# Patient Record
Sex: FEMALE | Race: WHITE
[De-identification: names, ages, dates, MRNs, and addresses within clinical notes are randomized per-mention and may not be internally consistent; named-entity substitution may affect disease eponyms.]

---

## 2019-07-14 ENCOUNTER — HOSPITAL ENCOUNTER (INPATIENT)
Dept: HOSPITAL 10 - OBT | Age: 24
LOS: 3 days | Discharge: HOME | End: 2019-07-17
Attending: OBSTETRICS & GYNECOLOGY | Admitting: OBSTETRICS & GYNECOLOGY
Payer: MEDICAID

## 2019-07-14 ENCOUNTER — HOSPITAL ENCOUNTER (INPATIENT)
Dept: HOSPITAL 91 - OBT | Age: 24
LOS: 3 days | Discharge: HOME | End: 2019-07-17
Payer: MEDICAID

## 2019-07-14 VITALS
WEIGHT: 204.37 LBS | HEIGHT: 61 IN | BODY MASS INDEX: 38.58 KG/M2 | BODY MASS INDEX: 38.58 KG/M2 | HEIGHT: 61 IN | WEIGHT: 204.37 LBS

## 2019-07-14 VITALS — DIASTOLIC BLOOD PRESSURE: 60 MMHG | SYSTOLIC BLOOD PRESSURE: 119 MMHG | RESPIRATION RATE: 20 BRPM | HEART RATE: 75 BPM

## 2019-07-14 DIAGNOSIS — Z3A.38: ICD-10-CM

## 2019-07-14 LAB
ABNORMAL IP MESSAGE: 1
ADD MAN DIFF?: NO
BASOPHIL #: 0 10^3/UL (ref 0–0.1)
BASOPHILS %: 0.3 % (ref 0–2)
EOSINOPHILS #: 0.4 10^3/UL (ref 0–0.5)
EOSINOPHILS %: 4.2 % (ref 0–7)
HEMATOCRIT: 37 % (ref 37–47)
HEMOGLOBIN: 12.2 G/DL (ref 12–16)
HEPATITIS B SURFACE ANTIGEN: NEGATIVE
IMMATURE GRANS #M: 0.04 10^3/UL (ref 0–0.03)
IMMATURE GRANS % (M): 0.5 % (ref 0–0.43)
INR: 0.84
LYMPHOCYTES #: 2 10^3/UL (ref 0.8–2.9)
LYMPHOCYTES %: 22.5 % (ref 15–51)
MEAN CORPUSCULAR HEMOGLOBIN: 30.5 PG (ref 29–33)
MEAN CORPUSCULAR HGB CONC: 33 G/DL (ref 32–37)
MEAN CORPUSCULAR VOLUME: 92.5 FL (ref 82–101)
MEAN PLATELET VOLUME: 13.4 FL (ref 7.4–10.4)
MONOCYTE #: 0.9 10^3/UL (ref 0.3–0.9)
MONOCYTES %: 9.9 % (ref 0–11)
NEUTROPHIL #: 5.5 10^3/UL (ref 1.6–7.5)
NEUTROPHILS %: 62.6 % (ref 39–77)
NUCLEATED RED BLOOD CELLS #: 0 10^3/UL (ref 0–0)
NUCLEATED RED BLOOD CELLS%: 0 /100WBC (ref 0–0)
PARTIAL THROMBOPLASTIN TIME: 27.3 SEC (ref 23–35)
PLATELET COUNT: 183 10^3/UL (ref 140–415)
POSITIVE DIFF: (no result)
PROTIME: 11.6 SEC (ref 11.9–14.9)
PT RATIO: 0.9
RED BLOOD COUNT: 4 10^6/UL (ref 4.2–5.4)
RED CELL DISTRIBUTION WIDTH: 14.4 % (ref 11.5–14.5)
WHITE BLOOD COUNT: 8.7 10^3/UL (ref 4.8–10.8)

## 2019-07-14 PROCEDURE — 86592 SYPHILIS TEST NON-TREP QUAL: CPT

## 2019-07-14 PROCEDURE — 86762 RUBELLA ANTIBODY: CPT

## 2019-07-14 PROCEDURE — 85025 COMPLETE CBC W/AUTO DIFF WBC: CPT

## 2019-07-14 PROCEDURE — 86850 RBC ANTIBODY SCREEN: CPT

## 2019-07-14 PROCEDURE — G0463 HOSPITAL OUTPT CLINIC VISIT: HCPCS

## 2019-07-14 PROCEDURE — 85730 THROMBOPLASTIN TIME PARTIAL: CPT

## 2019-07-14 PROCEDURE — 76815 OB US LIMITED FETUS(S): CPT

## 2019-07-14 PROCEDURE — 88307 TISSUE EXAM BY PATHOLOGIST: CPT

## 2019-07-14 PROCEDURE — 86900 BLOOD TYPING SEROLOGIC ABO: CPT

## 2019-07-14 PROCEDURE — 62322 NJX INTERLAMINAR LMBR/SAC: CPT

## 2019-07-14 PROCEDURE — 90716 VAR VACCINE LIVE SUBQ: CPT

## 2019-07-14 PROCEDURE — 87340 HEPATITIS B SURFACE AG IA: CPT

## 2019-07-14 PROCEDURE — 85610 PROTHROMBIN TIME: CPT

## 2019-07-14 PROCEDURE — 86901 BLOOD TYPING SEROLOGIC RH(D): CPT

## 2019-07-14 RX ADMIN — PYRIDOXINE HYDROCHLORIDE SCH MLS/HR: 100 INJECTION, SOLUTION INTRAMUSCULAR; INTRAVENOUS at 20:45

## 2019-07-14 RX ADMIN — PYRIDOXINE HYDROCHLORIDE 1 MLS/HR: 100 INJECTION, SOLUTION INTRAMUSCULAR; INTRAVENOUS at 20:45

## 2019-07-14 RX ADMIN — PYRIDOXINE HYDROCHLORIDE 1 MLS/HR: 100 INJECTION, SOLUTION INTRAMUSCULAR; INTRAVENOUS at 22:48

## 2019-07-14 NOTE — TRIAGE
===================================

OB Triage

===================================

Datetime Report Generated by CPN: 07/14/2019 20:28

   

   

===========================

Datetime: 07/14/2019 20:15

===========================

   

   

===================================

Vaginal Exam

===================================

   

 Dilatation (cms):  4.5

 Membrane Status:  Intact

   

===========================

Datetime: 07/14/2019 20:06

===========================

   

   

===================================

Pain Assessment

===================================

   

 Pain Scale:  7

 Pain Presence:  Intermittent

 Pain Type:  Contraction

 Pain Location:  Abdomen; Back

 Pain Goal:  7

 Pain Relief Measures:  Comfort Measures

 Pain Assessment Comments:  states pain is tolerable

   

===========================

Datetime: 07/14/2019 20:00

===========================

   

 Assessment Type:  Triage

   

===================================

Maternal Assessment

===================================

   

 Level of Consciousness:  Keenly Alert, Responsive

 DTR's/Clonus:  DTRs 2+; No Clonus

 Headache:  Denies

 Blurred Vision:  No

 Respiratory Effort:  Unlabored; Regular Rhythm; Equal Expansion

 Breath Sounds, Left:  Clear and Equal

 Breath Sounds, Right:  Clear and Equal

 Nausea/Vomiting:  Denies

 RUQ Epigastric Pain:  Denies

 Lower Extremities Edema:  Bilateral Lower Extremities

     Degree:  1+

 Upper Extremities Edema:  None

     Degree:  None

 Facial Edema:  None

   

===================================

Fall Risk Assessment

===================================

   

 History of Falling:  (0) No

 Secondary Diagnosis:  (0) No

 Ambulatory Aid:  (0) Bedrest/Nurse Assist

 IV Therapy:  (0) No

 Gait:  (0) Normal/Bedrest/Immobile

 Mental Status:  (0) Oriented to Own Ability

 Fall Score:  0

 Fall Risk Score Definition:  No Risk: No action required

   

===========================

Datetime: 07/14/2019 19:57

===========================

   

 Time of Arrival:  07/14/2019 19:54

 EGA:  38.1

 Arrived By:  Ambulatory

 Arrived From:  Home

 Chief Complaint:  UCS, SPOTTING

 Fetal Movement:  Decreased

 Contractions:  Regular

 Contractions:  5-7 MIN

 Rupture of Membranes:  Denies

 Vaginal Bleeding:  None

 Vaginal Discharge:  Denies

 Recent Sexual Intercouse:  Denies

 Abdominal Trauma:  Not Applicable

 Patient Complaints:  Contractions

 Time Provider Notified:  07/14/2019 20:23

 Provider Notified:  VIJAYA

 Initial Plan:  ERIKA HUTCHINSON

## 2019-07-14 NOTE — TRIAGE
===================================

OB Triage

===================================

Datetime Report Generated by CPN: 07/14/2019 20:34

   

   

===========================

Datetime: 07/14/2019 20:30

===========================

   

 Frequency:  1.5-5

 Monitor Mode:  External

 Duration (sec)2399:  

 Quality:  Moderate

 Pattern:  Normal: <= 5 Contractions in 10 Minutes

 Resting Tone [place]:  Relaxed

 FHR Baseline Rate:  150

 Monitor Mode:  External US

 Variability:  Moderate 6-25 bpm

 Accelerations:  15X15

 Decelerations:  Late

 Category:  Category II

## 2019-07-15 VITALS — SYSTOLIC BLOOD PRESSURE: 94 MMHG | RESPIRATION RATE: 19 BRPM | DIASTOLIC BLOOD PRESSURE: 50 MMHG | HEART RATE: 61 BPM

## 2019-07-15 VITALS — SYSTOLIC BLOOD PRESSURE: 112 MMHG | RESPIRATION RATE: 18 BRPM | HEART RATE: 82 BPM | DIASTOLIC BLOOD PRESSURE: 77 MMHG

## 2019-07-15 VITALS — RESPIRATION RATE: 18 BRPM | SYSTOLIC BLOOD PRESSURE: 114 MMHG | HEART RATE: 80 BPM | DIASTOLIC BLOOD PRESSURE: 68 MMHG

## 2019-07-15 VITALS — HEART RATE: 78 BPM | SYSTOLIC BLOOD PRESSURE: 110 MMHG | DIASTOLIC BLOOD PRESSURE: 76 MMHG | RESPIRATION RATE: 20 BRPM

## 2019-07-15 LAB — RAPID PLASMA REAGIN: NONREACTIVE

## 2019-07-15 PROCEDURE — 0HQ9XZZ REPAIR PERINEUM SKIN, EXTERNAL APPROACH: ICD-10-PCS | Performed by: OBSTETRICS & GYNECOLOGY

## 2019-07-15 PROCEDURE — 0HQ9XZZ REPAIR PERINEUM SKIN, EXTERNAL APPROACH: ICD-10-PCS

## 2019-07-15 RX ADMIN — Medication 1 MLS/HR: at 11:10

## 2019-07-15 RX ADMIN — IBUPROFEN 1 MG: 800 TABLET, FILM COATED ORAL at 11:25

## 2019-07-15 RX ADMIN — FENTANYL CIT 0.2 MG/100ML-ROPIV 0.2%-NACL 0.9% EPIDURAL INJ 1 ML: 2/0.2 SOLUTION at 02:43

## 2019-07-15 RX ADMIN — PYRIDOXINE HYDROCHLORIDE SCH MLS/HR: 100 INJECTION, SOLUTION INTRAMUSCULAR; INTRAVENOUS at 11:10

## 2019-07-15 RX ADMIN — PYRIDOXINE HYDROCHLORIDE 1 MLS/HR: 100 INJECTION, SOLUTION INTRAMUSCULAR; INTRAVENOUS at 21:09

## 2019-07-15 RX ADMIN — Medication 1 APPLIC: at 11:27

## 2019-07-15 RX ADMIN — PYRIDOXINE HYDROCHLORIDE SCH MLS/HR: 100 INJECTION, SOLUTION INTRAMUSCULAR; INTRAVENOUS at 01:47

## 2019-07-15 RX ADMIN — Medication 56 SPRAY: at 11:26

## 2019-07-15 RX ADMIN — PYRIDOXINE HYDROCHLORIDE 1 MLS/HR: 100 INJECTION, SOLUTION INTRAMUSCULAR; INTRAVENOUS at 08:03

## 2019-07-15 RX ADMIN — IBUPROFEN SCH MG: 800 TABLET ORAL at 23:40

## 2019-07-15 RX ADMIN — IBUPROFEN SCH MG: 800 TABLET ORAL at 11:25

## 2019-07-15 RX ADMIN — PYRIDOXINE HYDROCHLORIDE 1 MLS/HR: 100 INJECTION, SOLUTION INTRAMUSCULAR; INTRAVENOUS at 11:10

## 2019-07-15 RX ADMIN — IBUPROFEN SCH MG: 800 TABLET ORAL at 17:53

## 2019-07-15 RX ADMIN — WITCH HAZEL 40 PAD: 500 SOLUTION RECTAL; TOPICAL at 11:27

## 2019-07-15 RX ADMIN — PYRIDOXINE HYDROCHLORIDE 1 MLS/HR: 100 INJECTION, SOLUTION INTRAMUSCULAR; INTRAVENOUS at 01:47

## 2019-07-15 RX ADMIN — Medication 1 MLS/HR: at 09:31

## 2019-07-15 RX ADMIN — Medication PRN APPLIC: at 11:27

## 2019-07-15 RX ADMIN — IBUPROFEN 1 MG: 800 TABLET, FILM COATED ORAL at 17:53

## 2019-07-15 RX ADMIN — PYRIDOXINE HYDROCHLORIDE SCH MLS/HR: 100 INJECTION, SOLUTION INTRAMUSCULAR; INTRAVENOUS at 21:09

## 2019-07-15 RX ADMIN — IBUPROFEN 1 MG: 800 TABLET, FILM COATED ORAL at 23:40

## 2019-07-15 RX ADMIN — Medication 1 MLS/HR: at 10:27

## 2019-07-15 RX ADMIN — PYRIDOXINE HYDROCHLORIDE SCH MLS/HR: 100 INJECTION, SOLUTION INTRAMUSCULAR; INTRAVENOUS at 08:03

## 2019-07-15 NOTE — HP
Date/Time of Note


Date/Time of Note


DATE: 7/15/19 


TIME: 08:54





OB - History


Hx of Present Pregnancy


Free Text/Dictation


term pregnancy iin activve labor


:  1


Para:  0


Ultrasounds:  Normal mid trimester US


Obstetrical Complications:  None


Medical Complications:  None





Past Family/Social History


*


Past Medical, Surgical, Family and Obstetric Histories reviewed from prenatal 


chart.





OB  Admission Exam


Vital Signs


Vital Signs





Vital Signs


  Date      Temp  Pulse  Resp  B/P (MAP)   Pulse Ox  O2          O2 Flow    FiO2


Time                                                 Delivery    Rate


   19  98.0     75    20      119/60            Room Air


     20:17                           (79)








Physical Exam


HEENT:  WNL


Heart:  Rhythm Normal


Lungs:  Clear, Equal


Abdomen:  WNL


Extremities:  Normal


Reflexes:  Normal


Cervical Dilatation:  10cm


Effacement:  100%


Station:  +3


Membranes:  Ruptured


Amniotic Fluid:  Thick Meconium


Fetal Heart Rate:  130's


Accelerations:  Accelerations Present


Decelerations:  No Decelerations


Varibility:  Moderate


Contractions on Admission:  6-10 Minutes Apart


Intensity:  Moderate


Last 72 hours Lab Results


                                    CBC & BMP


19 20:30











OB  Assessment/Plan


Reason for admission:  active labor


Plan:  Expectant Management











MORGAN LILLY MD             Jul 15, 2019 08:55

## 2019-07-15 NOTE — PREAC
Date/Time of Note


Date/Time of Note


DATE: 7/15/19 


TIME: 00:07





Anesthesia Eval and Record


Evaluation


Time Pre-Procedure Interview


DATE: 19 


TIME: 23:20


Age


24


Sex


female


NPO:  8 hrs


Preoperative diagnosis


iup @ 38 wks., , labor


Planned procedure


luis angel





Past Medical History


Past Medical History:  Includes


Pregnancy:  : (2), Para: (0), Gestational age: (38 wks.)





Surgery & Anesthesia Issues


No known issue





Meds


Anticoagulation:  No


Beta Blocker within 24 hr:  No


Reason Beta Blocker not given:  Pt. not on B-Blocker


Reported Medications


Prenatal Vit No.124/Iron/FA (Prenatal Vitamin Tablet) 1 Each Tablet, 1 EACH PO 


DAILY, TAB


   19





Current Medications


Lactated Ringer's 1,000 ml @  125 mls/hr Q8H IV  Last administered on 19at 


20:45; Admin Dose 125 MLS/HR;  Start 19 at 20:23


Butorphanol Tartrate (Stadol) 2 mg Q2H  PRN IV .PAIN SCALE 6-10;  Start 19 


at 20:30


Lidocaine (Xylocaine 1% (Mpf)) 30 ml ONCE PRN INJ .EPISIOTOMY;  Start 19 at


20:30


Oxytocin/Lactated Ringer's 500 ml @  500 mls/hr ONCE POST PARTUM IV ;  Start 


19 at 20:30


Oxytocin/Lactated Ringer's 500 ml @  125 mls/hr POST PARTUM IV ;  Start 19 


at 20:30


Ibuprofen (Motrin) 600 mg ONCE PRN PO .PAIN 1-5;  Start 19 at 20:30


Lactated Ringer's 1,000 ml @  2,000 mls/hr Q30M PRN IV .ANESTHESIA Last 


administered on 19at 22:48; Admin Dose 2,000 MLS/HR;  Start 19 at 


20:23


Oxytocin/Lactated Ringer's 500 ml @ 0 mls/hr ONCE PRN IV .VAGINAL BLEEDING;  


Start 19 at 20:30


Methylergonovine Maleate (Methergine) 0.2 mg ONCE PRN IM .VAGINAL BLEEDING;  


Start 19 at 20:30


Carboprost Tromethamine (Hemabate) 250 mcg ONCE PRN IM .VAGINAL BLEEDING;  Start


19 at 20:30


Misoprostol (Cytotec) 1,000 mcg ONCE PRN OK .VAGINAL BLEEDING;  Start 19 at


20:30


Meds reviewed:  Yes





Allergies


Coded Allergies:  


     No Known Allergy (Unverified , 19)


Allergies Reviewed:  Yes





Labs/Studies


Labs Reviewed:  Reviewed by anesthesiologist


Result Diagram:  


19 2030





Laboratory Tests


19 20:30











Blood Bank


                  Test
                         19
20:30


                  Antibody Screen               NEGATIVE


                  Blood Type                    O POSITIVE


                  Rh Immune Globulin Candidate  NO





Pregnancy test:  Positive


Studies:  ECG (n/a), CXR (n/a)





Pre-procedure Exam


Last vitals





Vital Signs


  Date      Temp  Pulse  Resp  B/P (MAP)   Pulse Ox  O2          O2 Flow    FiO2


Time                                                 Delivery    Rate


   19  98.0     75    20      119/60            Room Air


     20:17                           (79)





Airway:  Adequate mouth opening, Adequate thyromental dist


Mallampati:  Mallampati II


Teeth:  Normal


Lung:  Normal


Heart:  Normal





ASA Physical Status


ASA physical status:  2


Emergency:  E





Planned Anesthetic


Neuraxial:  Epidural





Planned Pain Management


Epidural, Parenteral pain med, Local by surgeon





Pre-operative Attestations


Prior to commencing anesthesia and surgery, the patient was re-evaluated, there 


was verification of:


*The patient's identity


*The results of appropriate recent lab work and preoperative vital signs


*The above evaluation not changing prior to induction


*Anesthetic plan, risk benefits, alternative and complications discussed with 


patient/family; questions answered; patient/family understands, accepts and 


wishes to proceed.


 used











BARRIE CISNEROS MD           Jul 15, 2019 00:08

## 2019-07-15 NOTE — LDN
Date/Time of Note


Date/Time of Note


DATE: 7/15/19 


TIME: 08:55





Delivery Summary


NSD W.O COMPLIACTIONS


Placenta Delivered:  Spontaneously


Meconium:  none, Thick


Episiotomy:  No


Perineal laceration:  1


Anesthesia type:  Epidural


Estimated blood loss:  300


Sponge & Needle done & correct:  Yes


All needle counts correct:  Yes


Any foreign bodies felt in the:  No











MORGAN LILLY MD             Jul 15, 2019 08:56

## 2019-07-16 VITALS — HEART RATE: 70 BPM | SYSTOLIC BLOOD PRESSURE: 90 MMHG | RESPIRATION RATE: 18 BRPM | DIASTOLIC BLOOD PRESSURE: 60 MMHG

## 2019-07-16 VITALS — HEART RATE: 70 BPM | RESPIRATION RATE: 20 BRPM | SYSTOLIC BLOOD PRESSURE: 86 MMHG | DIASTOLIC BLOOD PRESSURE: 55 MMHG

## 2019-07-16 VITALS — HEART RATE: 70 BPM | SYSTOLIC BLOOD PRESSURE: 97 MMHG | RESPIRATION RATE: 18 BRPM | DIASTOLIC BLOOD PRESSURE: 56 MMHG

## 2019-07-16 VITALS — RESPIRATION RATE: 18 BRPM | HEART RATE: 89 BPM | SYSTOLIC BLOOD PRESSURE: 116 MMHG | DIASTOLIC BLOOD PRESSURE: 66 MMHG

## 2019-07-16 LAB
ABNORMAL IP MESSAGE: 1
ADD MAN DIFF?: NO
BASOPHIL #: 0.1 10^3/UL (ref 0–0.1)
BASOPHILS %: 0.4 % (ref 0–2)
EOSINOPHILS #: 0.4 10^3/UL (ref 0–0.5)
EOSINOPHILS %: 2.3 % (ref 0–7)
HEMATOCRIT: 31.8 % (ref 37–47)
HEMOGLOBIN: 10.5 G/DL (ref 12–16)
IMMATURE GRANS #M: 0.08 10^3/UL (ref 0–0.03)
IMMATURE GRANS % (M): 0.5 % (ref 0–0.43)
LYMPHOCYTES #: 2.3 10^3/UL (ref 0.8–2.9)
LYMPHOCYTES %: 15.2 % (ref 15–51)
MEAN CORPUSCULAR HEMOGLOBIN: 30.5 PG (ref 29–33)
MEAN CORPUSCULAR HGB CONC: 33 G/DL (ref 32–37)
MEAN CORPUSCULAR VOLUME: 92.4 FL (ref 82–101)
MEAN PLATELET VOLUME: 13.3 FL (ref 7.4–10.4)
MONOCYTE #: 0.9 10^3/UL (ref 0.3–0.9)
MONOCYTES %: 5.9 % (ref 0–11)
NEUTROPHIL #: 11.5 10^3/UL (ref 1.6–7.5)
NEUTROPHILS %: 75.7 % (ref 39–77)
NUCLEATED RED BLOOD CELLS #: 0 10^3/UL (ref 0–0)
NUCLEATED RED BLOOD CELLS%: 0 /100WBC (ref 0–0)
PLATELET COUNT: 152 10^3/UL (ref 140–415)
POSITIVE DIFF: (no result)
RED BLOOD COUNT: 3.44 10^6/UL (ref 4.2–5.4)
RED CELL DISTRIBUTION WIDTH: 14.8 % (ref 11.5–14.5)
RUBELLA ANTIBODY - IGG: 3.43 INDEX
RUBELLA ANTIBODY - IGM: <20 AU/ML
WHITE BLOOD COUNT: 15.2 10^3/UL (ref 4.8–10.8)

## 2019-07-16 RX ADMIN — IBUPROFEN 1 MG: 800 TABLET, FILM COATED ORAL at 12:10

## 2019-07-16 RX ADMIN — IBUPROFEN SCH MG: 800 TABLET ORAL at 05:29

## 2019-07-16 RX ADMIN — IBUPROFEN 1 MG: 800 TABLET, FILM COATED ORAL at 18:06

## 2019-07-16 RX ADMIN — IBUPROFEN SCH MG: 800 TABLET ORAL at 23:49

## 2019-07-16 RX ADMIN — IBUPROFEN 1 MG: 800 TABLET, FILM COATED ORAL at 05:29

## 2019-07-16 RX ADMIN — HYDROCODONE BITARTRATE AND ACETAMINOPHEN 1 TAB: 5; 325 TABLET ORAL at 08:33

## 2019-07-16 RX ADMIN — HYDROCODONE BITARTRATE AND ACETAMINOPHEN PRN TAB: 5; 325 TABLET ORAL at 08:33

## 2019-07-16 RX ADMIN — IBUPROFEN SCH MG: 800 TABLET ORAL at 12:10

## 2019-07-16 RX ADMIN — IBUPROFEN SCH MG: 800 TABLET ORAL at 18:06

## 2019-07-16 RX ADMIN — IBUPROFEN 1 MG: 800 TABLET, FILM COATED ORAL at 23:49

## 2019-07-16 RX ADMIN — Medication PRN APPLIC: at 21:03

## 2019-07-16 RX ADMIN — Medication 1 APPLIC: at 21:03

## 2019-07-16 NOTE — DS
Date/Time of Note


Date/Time of Note


DATE: 7/16/19 


TIME: 12:53





Discharge Summary


Admission/Discharge Info


Admit Date/Time


Jul 14, 2019 at 20:15


Discharge Date/Time





Discharge Diagnosis


term pregnancy


Patient Condition:  Stable


Hospital Course


unremarkable


Home Meds


Reported Medications


Prenatal Vit No.124/Iron/FA (Prenatal Vitamin Tablet) 1 Each Tablet, 1 EACH PO 


DAILY, TAB


   7/14/19


Primary Care Provider


Care Physician No Primary


Pending Labs





Laboratory Tests


Test
                                   7/16/19
07:10              7/16/19
08:00


Lab Scanned Report
             REFERENCE LAB
[id number]  



White Blood Count
              
                        15.2 10^3/ul
(4.8-10.8)


Red Blood Count
                
                       3.44 10^6/ul
(4.20-5.40)


Hemoglobin
                     
                          10.5 g/dl
(12.0-16.0)


Hematocrit
                     
                             31.8 %
(37.0-47.0)


Mean Corpuscular Volume
        
                           92.4 fl
(82.0-101.0)


Mean Corpuscular Hemoglobin
    
                            30.5 pg
(29.0-33.0)


Mean Corpuscular                
                          33.0 g/dl
(32.0-37.0)


Hemoglobin
Concent


Red Cell Distribution Width
    
                             14.8 %
(11.5-14.5)


Platelet Count
                 
                          152 10^3/UL
(140-415)


Mean Platelet Volume
           
                             13.3 fl
(7.4-10.4)


Immature Granulocytes %
        
                          0.500 %
(0.001-0.429)


Neutrophils %
                  
                             75.7 %
(39.0-77.0)


Lymphocytes %
                  
                             15.2 %
(15.0-51.0)


Monocytes %
                    
                               5.9 %
(0.0-11.0)


Eosinophils %
                  
                                2.3 %
(0.0-7.0)


Basophils %
                    
                                0.4 %
(0.0-2.0)


Nucleated Red Blood Cells %
    
                          0.0 /100WBC
(0.0-0.0)


Immature Granulocytes #
        
                      0.080 10^3/ul
(0.0-0.031)


Neutrophils #
                  
                         11.5 10^3/ul
(1.6-7.5)


Lymphocytes #
                  
                          2.3 10^3/ul
(0.8-2.9)


Monocytes #
                    
                          0.9 10^3/ul
(0.3-0.9)


Eosinophils #
                  
                          0.4 10^3/ul
(0.0-0.5)


Basophils #
                    
                          0.1 10^3/ul
(0.0-0.1)


Nucleated Red Blood Cells #
    
                          0.0 10^3/ul
(0.0-0.0)














MORGAN LILLY MD             Jul 16, 2019 12:54

## 2019-07-17 VITALS — RESPIRATION RATE: 16 BRPM | DIASTOLIC BLOOD PRESSURE: 67 MMHG | SYSTOLIC BLOOD PRESSURE: 116 MMHG | HEART RATE: 81 BPM

## 2019-07-17 VITALS — RESPIRATION RATE: 16 BRPM | HEART RATE: 66 BPM | SYSTOLIC BLOOD PRESSURE: 92 MMHG | DIASTOLIC BLOOD PRESSURE: 55 MMHG

## 2019-07-17 RX ADMIN — IBUPROFEN 1 MG: 800 TABLET, FILM COATED ORAL at 11:56

## 2019-07-17 RX ADMIN — IBUPROFEN SCH MG: 800 TABLET ORAL at 05:49

## 2019-07-17 RX ADMIN — VARICELLA VIRUS VACCINE LIVE 1 UNIT: 1350 INJECTION, POWDER, LYOPHILIZED, FOR SUSPENSION SUBCUTANEOUS at 09:00

## 2019-07-17 RX ADMIN — MEASLES, MUMPS, AND RUBELLA VIRUS VACCINE LIVE 1 ML: 1000; 12500; 1000 INJECTION, POWDER, LYOPHILIZED, FOR SUSPENSION SUBCUTANEOUS at 09:00

## 2019-07-17 RX ADMIN — HYDROCODONE BITARTRATE AND ACETAMINOPHEN PRN TAB: 5; 325 TABLET ORAL at 04:37

## 2019-07-17 RX ADMIN — IBUPROFEN 1 MG: 800 TABLET, FILM COATED ORAL at 05:49

## 2019-07-17 RX ADMIN — CLOSTRIDIUM TETANI TOXOID ANTIGEN (FORMALDEHYDE INACTIVATED), CORYNEBACTERIUM DIPHTHERIAE TOXOID ANTIGEN (FORMALDEHYDE INACTIVATED), BORDETELLA PERTUSSIS TOXOID ANTIGEN (GLUTARALDEHYDE INACTIVATED), BORDETELLA PERTUSSIS FILAMENTOUS HEMAGGLUTININ ANTIGEN (FORMALDEHYDE INACTIVATED), BORDETELLA PERTUSSIS PERTACTIN ANTIGEN, AND BORDETELLA PERTUSSIS FIMBRIAE 2/3 ANTIGEN 1 ML: 5; 2; 2.5; 5; 3; 5 INJECTION, SUSPENSION INTRAMUSCULAR at 09:00

## 2019-07-17 RX ADMIN — IBUPROFEN SCH MG: 800 TABLET ORAL at 11:56

## 2019-07-17 RX ADMIN — HYDROCODONE BITARTRATE AND ACETAMINOPHEN 1 TAB: 5; 325 TABLET ORAL at 04:37

## 2019-07-18 NOTE — DELSUM
===================================

Delivery Summary A-C

===================================

Datetime Report Generated by CPN: 2019 15:52

   

   

===================================

DELIVERY PERSONNEL

===================================

   

Circulator:  Ignacio, Mary Grace

   

===================================

MATERNAL INFORMATION

===================================

   

Delivery Anesthesia:  Epidural

Medications in Delivery:  pitocin

Delivery QBL (ml):  300

Placenta Cultured:  No

Maternal Complications:  Other

   

===================================

LABOR SUMMARY

===================================

   

EDC:  2019 00:00

No. Babies in Womb:  1

 Attempted:  No

Labor Anesthesia:  Epidural

   

===================================

LABOR INFORMATION

===================================

   

Reason for Induction:  Not Applicable

Onset of Labor:  2019 11:00

Complete Dilatation:  07/15/2019 08:04

Group B Beta Strep:  Negative

Antibiotics # of Doses:  0

Steroids Given:  None

Reason Steroids Not Administered:  Not Applicable

   

===================================

MEMBRANES

===================================

   

Membranes Rupture Method:  Artificial

Rupture of Membranes:  07/15/2019 08:22

Length of Rupture (hr):  0.20

Amniotic Fluid Color:  Heavy Meconium

Amniotic Fluid Amount:  Moderate

Amniotic Fluid Odor:  None

   

===================================

STAGES OF LABOR

===================================

   

Stage 1 hr:  21

Stage 1 min:  4

Stage 2 hr:  0

Stage 2 min:  30

Stage 3 hr:  0

Stage 3 min:  2

Total Time in Labor hr:  21

Total Time in Labor min:  36

   

===================================

VAGINAL DELIVERY

===================================

   

Episiotomy:  None

Laceration Extension:  First Degree

Laceration Type:  Perineal

Laceration Repair:  Yes

Initial Vag Sponge Count:  10

Final Vag Sponge Count:  10

Initial Vag Sharps Count:  1

Final Vag Sharps Count:  2

Sponge Count Correct:  Yes

Sharps Count Correct:  Yes

   

===================================

BABY A INFORMATION

===================================

   

Infant Delivery Date/Time:  07/15/2019 08:34

Method of Delivery:  Vaginal

Born in Route :  No

:  Successful

Forceps:  N/A

Vacuum Extraction:  N/A

Shoulder Dystocia :  N/A

   

===================================

SHOULDER DYSTOCIA BABY A

===================================

   

Infant Delivery Date/Time:  07/15/2019 08:34

   

===================================

PRESENTATION/POSITION BABY A

===================================

   

Presentation:  Cephalic

Cephalic Presentation:  Vertex

Vertex Position:  Left Occipital Anterior

Breech Presentation:  N/A

   

===================================

PLACENTA INFORMATION BABY A

===================================

   

Placenta Delivery Time :  07/15/2019 08:36

Placenta Method of Delivery:  Spontaneous

Placenta Status:  Delivered

   

===================================

APGAR SCORES BABY A

===================================

   

Heart Rate 1 min:  >100 bpm

Resp Effort 1 min:  Good Cry

Reflex Irritability 1 min:  Cough/Sneeze/Pulls Away

Muscle Tone 1 min:  Active Motion

Color 1 min:  Blue/Pale

Resuscitation Effort 1 min:  Tactile Stimulation

APGAR SCORE 1 MIN:  8

Heart Rate 5 min:  >100 bpm

Resp Effort 5 min:  Good Cry

Reflex Irritability 5 min:  Cough/Sneeze/Pulls Away

Muscle Tone 5 min:  Active Motion

Color 5 min:  Body Pink, Extremit Blue

Resuscitation Effort 5 min:  Tactile Stimulation

APGAR SCORE 5 MIN:  9

   

===================================

INFANT INFORMATION BABY A

===================================

   

Gestational Age at Delivery:  38.2

Gestational Status:  Early Term- 37- 38.6 Weeks

Infant Outcome :  Liveborn, with signs of life

Infant Condition :  Stable

Infant Sex:  Female

   

===================================

IDENTIFICATION/MEDS BABY A

===================================

   

ID Band Number:  [id number]

ID Band Location:  Right Leg; Left Arm



Sensor Applied:  Yes

Sensor Number:  [id number]

Sensor Location :  Cord Clamp

Vitamin K Given :  Not Given

Erythromycin Given:  Not Given

   

===================================

WEIGHT/LENGTH BABY A

===================================

   

Infant Birthweight (gm):  3020

Infant Weight (lb):  6

Infant Weight (oz):  11

Infant Length (in):  19.00

Infant Length (cm):  48.26

   

===================================

CORD INFORMATION BABY A

===================================

   

No. Cord Vessels:  3

Nuchal Cord :  N/A

Cord Blood Taken:  Yes

Infant Suction:  Mouth; Nose

   

===================================

ASSESSMENT BABY A

===================================

   

Infant Complications:  None

Physical Findings at Delivery:  Within Normal Limits

Infant Respirations:  Tachypnea

Neonatologist/ALS Called :  Yes

Infant Care By:  Brooke RN and RT

Transferred To:  Remains with Mother

## 2019-07-20 NOTE — PAC
Date/Time of Note


Date/Time of Note


DATE: 7/15/19 


TIME: 16:00





Post-Anesthesia Notes


Post-Anesthesia Note


Last documented vital signs





Vital Signs


  Date      Temp  Pulse  Resp  B/P (MAP)   Pulse Ox  O2          O2 Flow    FiO2


Time                                                 Delivery    Rate


   7/17/19  98.2     81    16      116/67            Room Air


     08:00                           (83)





Activity:  WNL


Respiratory function:  WNL


Cardiovascular function:  WNL


Mental status:  Baseline


Pain reasonably controlled:  Yes


Hydration appropriate:  Yes


Nausea/Vomiting absent:  Yes











BARRIE CISNEROS MD           Jul 20, 2019 12:56